# Patient Record
Sex: MALE | Race: WHITE
[De-identification: names, ages, dates, MRNs, and addresses within clinical notes are randomized per-mention and may not be internally consistent; named-entity substitution may affect disease eponyms.]

---

## 2023-01-25 ENCOUNTER — HOSPITAL ENCOUNTER (OUTPATIENT)
Dept: HOSPITAL 76 - EMS | Age: 48
Discharge: ANOTHER HOSPITAL | End: 2023-01-25
Payer: COMMERCIAL

## 2023-01-25 ENCOUNTER — HOSPITAL ENCOUNTER (EMERGENCY)
Dept: HOSPITAL 76 - ED | Age: 48
Discharge: HOME | End: 2023-01-25
Payer: COMMERCIAL

## 2023-01-25 VITALS — DIASTOLIC BLOOD PRESSURE: 90 MMHG | SYSTOLIC BLOOD PRESSURE: 159 MMHG

## 2023-01-25 DIAGNOSIS — R07.89: Primary | ICD-10-CM

## 2023-01-25 DIAGNOSIS — R07.9: Primary | ICD-10-CM

## 2023-01-25 DIAGNOSIS — R11.2: ICD-10-CM

## 2023-01-25 DIAGNOSIS — F41.9: ICD-10-CM

## 2023-01-25 LAB
ALBUMIN DIAFP-MCNC: 4.6 G/DL (ref 3.2–5.5)
ALBUMIN/GLOB SERPL: 1.3 {RATIO} (ref 1–2.2)
ALP SERPL-CCNC: 70 IU/L (ref 42–121)
ALT SERPL W P-5'-P-CCNC: 30 IU/L (ref 10–60)
ANION GAP SERPL CALCULATED.4IONS-SCNC: 10 MMOL/L (ref 6–13)
AST SERPL W P-5'-P-CCNC: 24 IU/L (ref 10–42)
BASOPHILS NFR BLD AUTO: 0 10^3/UL (ref 0–0.1)
BASOPHILS NFR BLD AUTO: 0.3 %
BILIRUB BLD-MCNC: 0.9 MG/DL (ref 0.2–1)
BUN SERPL-MCNC: 17 MG/DL (ref 6–20)
CALCIUM UR-MCNC: 9.2 MG/DL (ref 8.5–10.3)
CHLORIDE SERPL-SCNC: 104 MMOL/L (ref 101–111)
CO2 SERPL-SCNC: 28 MMOL/L (ref 21–32)
CREAT SERPLBLD-SCNC: 1 MG/DL (ref 0.6–1.2)
EOSINOPHIL # BLD AUTO: 0.1 10^3/UL (ref 0–0.7)
EOSINOPHIL NFR BLD AUTO: 0.8 %
ERYTHROCYTE [DISTWIDTH] IN BLOOD BY AUTOMATED COUNT: 12.5 % (ref 12–15)
GFRSERPLBLD MDRD-ARVRAT: 80 ML/MIN/{1.73_M2} (ref 89–?)
GLOBULIN SER-MCNC: 3.5 G/DL (ref 2.1–4.2)
GLUCOSE SERPL-MCNC: 142 MG/DL (ref 70–100)
HCT VFR BLD AUTO: 48.6 % (ref 42–52)
HGB UR QL STRIP: 15.7 G/DL (ref 14–18)
LIPASE SERPL-CCNC: 38 U/L (ref 22–51)
LYMPHOCYTES # SPEC AUTO: 0.5 10^3/UL (ref 1.5–3.5)
LYMPHOCYTES NFR BLD AUTO: 4.5 %
MCH RBC QN AUTO: 30.5 PG (ref 27–31)
MCHC RBC AUTO-ENTMCNC: 32.3 G/DL (ref 32–36)
MCV RBC AUTO: 94.6 FL (ref 80–94)
MONOCYTES # BLD AUTO: 0.4 10^3/UL (ref 0–1)
MONOCYTES NFR BLD AUTO: 3.9 %
NEUTROPHILS # BLD AUTO: 9.7 10^3/UL (ref 1.5–6.6)
NEUTROPHILS # SNV AUTO: 10.7 X10^3/UL (ref 4.8–10.8)
NEUTROPHILS NFR BLD AUTO: 90.3 %
NRBC # BLD AUTO: 0 /100WBC
NRBC # BLD AUTO: 0 X10^3/UL
PDW BLD AUTO: 9.7 FL (ref 7.4–11.4)
PLATELET # BLD: 226 10^3/UL (ref 130–450)
POTASSIUM SERPL-SCNC: 3.7 MMOL/L (ref 3.5–5)
PROT SPEC-MCNC: 8.1 G/DL (ref 6.7–8.2)
RBC MAR: 5.14 10^6/UL (ref 4.7–6.1)
SODIUM SERPLBLD-SCNC: 142 MMOL/L (ref 135–145)

## 2023-01-25 PROCEDURE — 36415 COLL VENOUS BLD VENIPUNCTURE: CPT

## 2023-01-25 PROCEDURE — 93005 ELECTROCARDIOGRAM TRACING: CPT

## 2023-01-25 PROCEDURE — 99283 EMERGENCY DEPT VISIT LOW MDM: CPT

## 2023-01-25 PROCEDURE — 83690 ASSAY OF LIPASE: CPT

## 2023-01-25 PROCEDURE — 85025 COMPLETE CBC W/AUTO DIFF WBC: CPT

## 2023-01-25 PROCEDURE — 80053 COMPREHEN METABOLIC PANEL: CPT

## 2023-01-25 PROCEDURE — 84484 ASSAY OF TROPONIN QUANT: CPT

## 2023-01-25 PROCEDURE — 99284 EMERGENCY DEPT VISIT MOD MDM: CPT

## 2023-01-25 NOTE — ED PHYSICIAN DOCUMENTATION
History of Present Illness





- Stated complaint


Stated Complaint: CHEST PX





- History obtained from


History obtained from: Patient, EMS





- Additonal information


Additional information: 





47-year-old male with past medical history of MI presents with upset stomach 

this evening after eating Chinese food followed by 3 episodes of nonbloody 

nonbilious nausea and vomiting and epigastric pain radiating up to the 

chest.Pain improved status post nitro and 325 of aspirin that he took at his 

hotel. Denies chest pain at present, shortness of breath, diaphoresis.  No fever





Review of Systems


Constitutional: denies: Fever


Cardiac: reports: Chest pain / pressure


Respiratory: denies: Dyspnea


GI: reports: Nausea, Vomiting, Diarrhea.  denies: Abdominal Pain





PD PAST MEDICAL HISTORY





- Present Medications


Home Medications: 


                                Ambulatory Orders











 Medication  Instructions  Recorded  Confirmed


 


Amlodipine Besylate [Norvasc] 2.5 mg PO 01/25/23 


 


Aspirin Chewable [[place]  01/25/23 





Aspirin]   


 


Atorvastatin Calcium [Lipitor]  01/25/23 


 


Lisinopril [Zestril]  01/25/23 


 


Metoprolol Succinate [Toprol Xl]  01/25/23 


 


Nitroglycerin [Nitrostat]  01/25/23 














- Allergies


Allergies/Adverse Reactions: 


                                    Allergies











Allergy/AdvReac Type Severity Reaction Status Date / Time


 


No Known Drug Allergies Allergy   Verified 01/25/23 01:34














PD ED PE NORMAL





- Vitals


Vital signs reviewed: Yes





- General


General: Alert and oriented X 3, No acute distress, Well developed/nourished





- HEENT


HEENT: Atraumatic, PERRL, EOMI





- Cardiac


Cardiac: RRR





- Respiratory


Respiratory: No respiratory distress, Clear bilaterally





Results





- Vitals


Vitals: 


                               Vital Signs - 24 hr











  01/25/23 01/25/23





  01:26 01:34


 


Temperature 37.1 C 


 


Heart Rate 98 104 H


 


Respiratory 19 22





Rate  


 


Blood Pressure 180/95 H 180/95 H


 


O2 Saturation 98 97








                                     Oxygen











O2 Source                      Room air

















- EKG (time done)


  ** 0135


Rate: Rate (enter#) (93)


Rhythm: NSR


Axis: Normal


Intervals: Normal MS


QRS: Normal


Ischemia: Normal ST segments





- Labs


Labs: 


                                Laboratory Tests











  01/25/23 01/25/23 01/25/23





  01:31 01:31 01:31


 


WBC  10.7  


 


RBC  5.14  


 


Hgb  15.7  


 


Hct  48.6  


 


MCV  94.6 H  


 


MCH  30.5  


 


MCHC  32.3  


 


RDW  12.5  


 


Plt Count  226  


 


MPV  9.7  


 


Neut # (Auto)  9.7 H  


 


Lymph # (Auto)  0.5 L  


 


Mono # (Auto)  0.4  


 


Eos # (Auto)  0.1  


 


Baso # (Auto)  0.0  


 


Absolute Nucleated RBC  0.00  


 


Nucleated RBC %  0.0  


 


Sodium   142 


 


Potassium   3.7 


 


Chloride   104 


 


Carbon Dioxide   28 


 


Anion Gap   10.0 


 


BUN   17 


 


Creatinine   1.0 


 


Estimated GFR (MDRD)   80 L 


 


Glucose   142 H 


 


Calcium   9.2 


 


Total Bilirubin   0.9 


 


AST   24 


 


ALT   30 


 


Alkaline Phosphatase   70 


 


Troponin I High Sens    5.1


 


Total Protein   8.1 


 


Albumin   4.6 


 


Globulin   3.5 


 


Albumin/Globulin Ratio   1.3 


 


Lipase   38 














PD Medical Decision Making





- ED course


ED course: 





39-year-old man with history of MI presents with chest pain and vomiting after 

eating Chinese food this past evening around noon.  Including CBC, CMP abdominal

 panel, troponin uncovered no acute abnormalities.  EKG noncontributory.  Chest 

x-ray interpreted by myself and outside radiologist as no evidence of acute 

cardiopulmonary disease.  Discussed with patient and return precautions.  Plan 

to follow-up with primary care provider and cardiologist.





History obtained from patient and EMS.





Departure





- Departure


Disposition: 01 Home, Self Care


Clinical Impression: 


 Chest pain





Condition: Good


Instructions:  ED Chest Pain Atypical Unkn Cause


Comments: 


You were seen in the emergency department for evaluation of chest pain.  Your l

ab work, EKG, chest x-ray uncovered no emergent cause for your symptoms.  Please

 follow-up with your cardiologist and your primary care provider.  Return to the

 emergency department for new or worsening symptoms or concerns.

## 2023-01-25 NOTE — XRAY REPORT
PROCEDURE:  Chest 1 View X-Ray

 

INDICATIONS:  Chest Pain

 

TECHNIQUE:  One view of the chest was acquired.  

 

COMPARISON:  None.

 

FINDINGS:  

 

Surgical changes and devices:  None.  

 

Lungs and pleura:  No pleural effusions or pneumothorax.  Lungs are clear.  

 

Mediastinum:  Mediastinal contours appear normal.  Heart size is normal.  

 

Bones and chest wall:  No suspicious bony lesions.  Overlying soft tissues appear unremarkable.  

 

 

IMPRESSION:  

 

1. No acute cardiopulmonary disease.

 

Reviewed by: Cyrus Catherine MD on 1/25/2023 1:55 AM PST

Approved by: Cyrus Catherine MD on 1/25/2023 1:55 AM [facility]

 

 

Station ID:  [id number]